# Patient Record
Sex: MALE | Race: WHITE | NOT HISPANIC OR LATINO
[De-identification: names, ages, dates, MRNs, and addresses within clinical notes are randomized per-mention and may not be internally consistent; named-entity substitution may affect disease eponyms.]

---

## 2022-10-12 ENCOUNTER — APPOINTMENT (OUTPATIENT)
Dept: ORTHOPEDIC SURGERY | Facility: CLINIC | Age: 47
End: 2022-10-12

## 2022-10-12 ENCOUNTER — RESULT CHARGE (OUTPATIENT)
Age: 47
End: 2022-10-12

## 2022-10-12 VITALS — BODY MASS INDEX: 31.15 KG/M2 | HEIGHT: 72 IN | WEIGHT: 230 LBS

## 2022-10-12 DIAGNOSIS — M72.2 PLANTAR FASCIAL FIBROMATOSIS: ICD-10-CM

## 2022-10-12 PROCEDURE — 99203 OFFICE O/P NEW LOW 30 MIN: CPT

## 2022-10-12 PROCEDURE — 72050 X-RAY EXAM NECK SPINE 4/5VWS: CPT

## 2022-10-12 PROCEDURE — 73030 X-RAY EXAM OF SHOULDER: CPT | Mod: LT

## 2022-10-12 NOTE — ASSESSMENT
[FreeTextEntry1] :  patient currently is symptomatic in his cervical spine I recommended an updated MRI to see  how much cord compression may be present  currently he is scheduled to see neurology next month  they may consider EMG/NCT's  of his upper extremities\par  he can call after the   cervical MRI is done to discuss findings and any direction of treatment it may be appropriate to consider  such as a cervical epidural injection  I deferred on a cortisone injection to either shoulder today we may want to consider some physical therapy and eventually an MRI to the left shoulder\par  at this time based on his  orthopedic history his current symptoms, physical findings and test results it is my opinion that he is totally disabled unable to work    return in a few months

## 2022-10-12 NOTE — HISTORY OF PRESENT ILLNESS
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 cervical issues after an work injury and was sent for an MRI of the cervical spine did have some conservative treatment was offered a cervical epidural but did not pursue it and return fundamentally to his duties he then had another line of duty injury while working and fell through a stair injuring his right shoulder and had surgery February 2018 by  and Margaret was on light duty for over a year could not get back to full duty capacity and was retired lately has been having some pain in the left shoulder feels similar to the right describe some numbness in the right arm and occasionally left difficulty lying down.  He can sit or stand now walk 2 hours\par  he has had no diagnostics to the left shoulder or recent diagnostics to the neck such as x-ray MRI or nerve tests \par  does report complains of pain in both feet worse in the morning\par  takes medicine for high blood pressure  does have GERD allergy to Cipro does not smoke   weight 230

## 2022-10-12 NOTE — IMAGING
[de-identified] :  pleasant gentleman he has to examine on the exam table normal head posture moderate to marked limits in rotation flexion extension reflexes brisk and symmetric mild dysesthesia down into the right arm\par \par Both shoulders have restrictions in motion elevation rotation right more than impingement is noted both\par \par Mild spasm lower lumbar region some tightness to dorsal lumbar fascia hamstrings negative straight leg bilaterally\par \par \par X-rays cervical spine today moderate to marked discogenic change above and below congenital fusion\par \par  left shoulder x-rays unremarkable\par \par MRI cervical spine 09/12/2011:  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

## 2022-11-02 ENCOUNTER — APPOINTMENT (OUTPATIENT)
Dept: CARDIOLOGY | Facility: CLINIC | Age: 47
End: 2022-11-02
Payer: COMMERCIAL

## 2022-11-02 VITALS — HEIGHT: 72 IN | TEMPERATURE: 97.2 F | WEIGHT: 232 LBS | BODY MASS INDEX: 31.42 KG/M2

## 2022-11-02 VITALS — DIASTOLIC BLOOD PRESSURE: 80 MMHG | HEART RATE: 59 BPM | SYSTOLIC BLOOD PRESSURE: 130 MMHG

## 2022-11-02 PROCEDURE — 99204 OFFICE O/P NEW MOD 45 MIN: CPT | Mod: 25

## 2022-11-02 PROCEDURE — 93000 ELECTROCARDIOGRAM COMPLETE: CPT | Mod: 59

## 2022-11-02 PROCEDURE — 93242 EXT ECG>48HR<7D RECORDING: CPT

## 2022-11-02 NOTE — REASON FOR VISIT
[Symptom and Test Evaluation] : symptom and test evaluation [Hypertension] : hypertension [FreeTextEntry3] : Dr. Britt

## 2022-11-02 NOTE — HISTORY OF PRESENT ILLNESS
[FreeTextEntry1] : The patient has HTN and GERD and hiatal hernia when he lies down at night he feels a pounding in chest . His HR is not in particular fast but is forceful. He takes his BP during this time and it is usually normal . He takes Pantoprazole for his GERD

## 2022-11-02 NOTE — ASSESSMENT
[FreeTextEntry1] : The patient has had chest pounding usually when lying down at night . He does not feel his HR is fast during this however . This usually wakes him up .

## 2022-11-02 NOTE — REVIEW OF SYSTEMS
[Chest Discomfort] : chest discomfort [Tingling (Paresthesia)] : tingling [Negative] : Heme/Lymph [SOB] : no shortness of breath [Dyspnea on exertion] : not dyspnea during exertion

## 2022-11-14 ENCOUNTER — APPOINTMENT (OUTPATIENT)
Dept: CARDIOLOGY | Facility: CLINIC | Age: 47
End: 2022-11-14

## 2022-11-14 PROCEDURE — 93244 EXT ECG>48HR<7D REV&INTERPJ: CPT

## 2023-01-04 ENCOUNTER — APPOINTMENT (OUTPATIENT)
Dept: CARDIOLOGY | Facility: CLINIC | Age: 48
End: 2023-01-04
Payer: COMMERCIAL

## 2023-01-04 PROCEDURE — 93320 DOPPLER ECHO COMPLETE: CPT

## 2023-01-04 PROCEDURE — 93351 STRESS TTE COMPLETE: CPT

## 2023-01-04 PROCEDURE — 93325 DOPPLER ECHO COLOR FLOW MAPG: CPT

## 2023-01-17 ENCOUNTER — APPOINTMENT (OUTPATIENT)
Dept: ORTHOPEDIC SURGERY | Facility: CLINIC | Age: 48
End: 2023-01-17
Payer: COMMERCIAL

## 2023-01-17 DIAGNOSIS — M77.11 LATERAL EPICONDYLITIS, RIGHT ELBOW: ICD-10-CM

## 2023-01-17 PROCEDURE — 99213 OFFICE O/P EST LOW 20 MIN: CPT

## 2023-01-17 NOTE — IMAGING
[de-identified] :  pleasant gentleman he has to examine on the exam table normal head posture moderate to marked limits in rotation flexion extension reflexes brisk and symmetric mild dysesthesia down into the right arm\par \par \par Both shoulders have restrictions in motion elevation rotation right more than impingement is noted both\par \par Mild spasm lower lumbar region some tightness to dorsal lumbar fascia hamstrings negative straight leg bilaterally\par  MRI lumbar spine 12/06/2022 broad-based herniated disc L4-5 and L5-S1 bulge at L3-4\par \par \par X-rays cervical spine today moderate to marked discogenic change above and below congenital fusion\par \par  left shoulder x-rays unremarkable\par \par MRI cervical spine 09/12/2011:  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 degenerative osteophytes above and below with disc bulges mild cord flattening without edema of spinal cord or myelomalacia\par \par  MRI cervical spine 10/27/2022 diffuse degenerative change disc herniation 4 5 no significant stenosis multilevel foraminal narrowing \par \par MRI right shoulder  01/10/2018: Tendinosis with focal full-thickness rotator tear of supraspinatus\par \par  both ankles have good motion tenderness on the medial side calcaneus good arch negative anterior drawer Achilles intact\par  right elbow full motion pain laterally worse with resisted supination extension at the wrist

## 2023-01-17 NOTE — HISTORY OF PRESENT ILLNESS
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 cervical issues after an work injury and was sent for an MRI of the cervical spine did have some conservative treatment was offered a cervical epidural but did not pursue it and return fundamentally to his duties he then had another line of duty injury while working and fell through a stair injuring his right shoulder and had surgery February 2018 by  and Margaret was on light duty for over a year could not get back to full duty capacity and was retired lately has been having some pain in the left shoulder feels similar to the right describe some numbness in the right arm and occasionally left difficulty lying down.  He can sit or stand now walk 2 hours\par  he has had no diagnostics to the left shoulder or recent diagnostics to the neck such as x-ray MRI or nerve tests \par  does report complains of pain in both feet worse in the morning\par  takes medicine for high blood pressure  does have GERD allergy to Cipro does not smoke   weight 230

## 2023-01-17 NOTE — ASSESSMENT
[FreeTextEntry1] :  patient currently is symptomatic in his cervical spine  with pain beyond the left scapula\par   I deferred on a cortisone injection to either shoulder today we  will continue physical therapy and eventually an MRI to the left shoulder\par  at this time based on his  orthopedic history his current symptoms, physical findings and test results it is my opinion that he is totally disabled unable to work    return in a few months\par  I asked that he get me the nerve test report deferred on a cortisone injection in the right elbow

## 2023-01-17 NOTE — REASON FOR VISIT
[FreeTextEntry2] : cervical spine and bilateral shoulder pain\par Been having some right dominant elbow pain a couple  of months pain in the neck behind the left shoulder blade was sent for therapy 10 visits did get the MRI cervical spine and was also sent for lumbar spine  by his pain management doctor in addition to getting nerve tests done

## 2023-01-20 ENCOUNTER — APPOINTMENT (OUTPATIENT)
Dept: CARDIOLOGY | Facility: CLINIC | Age: 48
End: 2023-01-20
Payer: COMMERCIAL

## 2023-01-20 VITALS
HEIGHT: 72 IN | WEIGHT: 230 LBS | BODY MASS INDEX: 31.15 KG/M2 | SYSTOLIC BLOOD PRESSURE: 128 MMHG | DIASTOLIC BLOOD PRESSURE: 80 MMHG | HEART RATE: 58 BPM

## 2023-01-20 DIAGNOSIS — Z00.00 ENCOUNTER FOR GENERAL ADULT MEDICAL EXAMINATION W/OUT ABNORMAL FINDINGS: ICD-10-CM

## 2023-01-20 DIAGNOSIS — R07.89 OTHER CHEST PAIN: ICD-10-CM

## 2023-01-20 PROCEDURE — 99214 OFFICE O/P EST MOD 30 MIN: CPT | Mod: 25

## 2023-01-20 PROCEDURE — 93000 ELECTROCARDIOGRAM COMPLETE: CPT

## 2023-01-20 NOTE — ASSESSMENT
[FreeTextEntry1] : The patient has periods of chest pounding . EPATCH showed no arrhythmias during these symptoms . He has SVT vs sinus tachycardia while fishing but no symptoms . There is suggestion of intermittent Brugada pattern on ECG .Diffiscult to say if arrhythjmias are causing his symptms . He does have a bradycardia on ECG but does not have chronotropic incompetence

## 2023-01-20 NOTE — HISTORY OF PRESENT ILLNESS
[FreeTextEntry1] : 47 y.o. male with PMH of HTN, hyperlipidemia, complains of atypical chest pain and "chest pounding", GERD presents for F/U cardiology visit. Terrie is in [place] today. He had Holter monitor in November which showed no events or arrhythmia, HR  with average of 73 bpm. Normal LV function on TTE. Cholesterol is near goal. Terrie had endoscopy recently: hiatal hernia, Mayo esophagus.\par Terrie complains of continuing pounding in his chest, which wakes him up, usually happens after big meals. Going through stressful period right now.  ECG today shows repolarization c/w Brugada ( intermiitent )  . The patient did have a rapid heartr rhythm on EPATCH during fishing but he was not symptomatic with that . \par \par

## 2023-01-20 NOTE — CARDIOLOGY SUMMARY
[de-identified] : 1/20/2023 Sinus bradycardia 48bpm 1degree AVB\par 11-2-2022 Sinus Bradycardia IVCD \par 1-  Sinus bradycardia RSR' with ST changes c/w Brugada (intermittent)  [de-identified] : Holter\par 11/2-11/7/2023: HR  with average 73bpm, small burden of PACs and PVCs.  [de-identified] : Stress ECHO\par 01/04/2023- negative for ischemia, EF 61%, ascending aorta 3.3 cm. Completed Stage III

## 2023-02-07 ENCOUNTER — APPOINTMENT (OUTPATIENT)
Dept: CARDIOLOGY | Facility: CLINIC | Age: 48
End: 2023-02-07
Payer: COMMERCIAL

## 2023-02-07 ENCOUNTER — APPOINTMENT (OUTPATIENT)
Dept: ELECTROPHYSIOLOGY | Facility: CLINIC | Age: 48
End: 2023-02-07
Payer: COMMERCIAL

## 2023-02-07 VITALS
HEIGHT: 72 IN | OXYGEN SATURATION: 98 % | WEIGHT: 230 LBS | HEART RATE: 59 BPM | DIASTOLIC BLOOD PRESSURE: 84 MMHG | SYSTOLIC BLOOD PRESSURE: 130 MMHG | BODY MASS INDEX: 31.15 KG/M2

## 2023-02-07 DIAGNOSIS — I49.8 OTHER SPECIFIED CARDIAC ARRHYTHMIAS: ICD-10-CM

## 2023-02-07 DIAGNOSIS — Z72.0 TOBACCO USE: ICD-10-CM

## 2023-02-07 DIAGNOSIS — Z86.79 PERSONAL HISTORY OF OTHER DISEASES OF THE CIRCULATORY SYSTEM: ICD-10-CM

## 2023-02-07 DIAGNOSIS — Z80.1 FAMILY HISTORY OF MALIGNANT NEOPLASM OF TRACHEA, BRONCHUS AND LUNG: ICD-10-CM

## 2023-02-07 PROCEDURE — 93000 ELECTROCARDIOGRAM COMPLETE: CPT

## 2023-02-07 PROCEDURE — ZZZZZ: CPT

## 2023-02-07 PROCEDURE — 99215 OFFICE O/P EST HI 40 MIN: CPT | Mod: 25

## 2023-02-14 ENCOUNTER — APPOINTMENT (OUTPATIENT)
Dept: ELECTROPHYSIOLOGY | Facility: CLINIC | Age: 48
End: 2023-02-14

## 2023-02-16 ENCOUNTER — APPOINTMENT (OUTPATIENT)
Dept: PAIN MANAGEMENT | Facility: CLINIC | Age: 48
End: 2023-02-16
Payer: COMMERCIAL

## 2023-02-16 VITALS — WEIGHT: 230 LBS | BODY MASS INDEX: 31.15 KG/M2 | HEIGHT: 72 IN

## 2023-02-16 PROCEDURE — 96136 PSYCL/NRPSYC TST PHY/QHP 1ST: CPT | Mod: 59

## 2023-02-16 PROCEDURE — 99204 OFFICE O/P NEW MOD 45 MIN: CPT | Mod: 25

## 2023-02-16 NOTE — HISTORY OF PRESENT ILLNESS
[FreeTextEntry1] : HISTORY OF PRESENT ILLNESS: Mr. Steve is a 47 year old male complaining of shoulder, neck and lower back pain.  He is a retired [place] .  He is also undergoing cardiac workup for possible electrophysiologic abnormality.  He is also in conjunction seeing orthopedics for his extremity complaints.\par \par As for his neck pain, he states the pain is constant in nature.  He states the pain is severe and currently rated as a 9/10 on the pain scale.  He states the pain starts in the cervical spine radiates into the right upper extremity with associated numbness, tingling and spasms. \par \par As for his lower back pain, he states the pain is constant in nature.  He states the pain is severe and currently rated as a 9/10 on the pain scale.  He states the pain starts in the cervical spine radiates the bilateral lower extremities with associated numbness, tingling and spasms.  He states there is also pain with sitting, standing or walking secondary to the pain.\par \par The pain started after a work related injury.  The patient has had this pain for 10 on the half years, with pain worsening over the last 2 years.  Patient describes the pain as severe.  During the last month the pain has been nearly constant with symptoms worsening no typical pattern. Pain described as pressure-like, dull/aching.  Pain is associated with numbness/pins and needles into the upper and lower extremities.  Patient has weakness in the upper and lower extremities.  Pain is increased with lying down, sitting, relaxing.  Pain is decreased with standing, exercising.  Pain is not changed with walking, coughing/sneezing and bowel movements.  Bowel or bladder habits have not changed.\par  \par ACTIVITIES: Patient could walk 3-4 blocks before the pain starts.  Patient can sit 10 minutes before pain starts.  Patient can stand 1 hour before pain starts.  The patient sometimes lies down because of pain.  Patient uses no assistive walking device at this time.  Patient has difficulty going to work, participating in recreational activities at this time.\par \par PRIOR PAIN TREATMENTS:  Moderate relief with surgery, physical therapy, exercise, 10s unit, heat treatment, acupuncture and chiropractic manipulation.  No relief with cold treatment.\par \par Prior Pain Medications:  Naproxen.\par

## 2023-02-16 NOTE — PHYSICAL EXAM
[de-identified] : NECK - tenderness into the cervical paraspinals. ROM Restricted. Pain with flexion. Positive Spurling bilaterally.\par \par BACK - tenderness into the lumbar paraspinals. ROM Restricted. Pain with flexion. Positive SLR bilaterally.

## 2023-02-16 NOTE — DATA REVIEWED
[FreeTextEntry1] : MRI of the cervical spine taken on 10/27/2022 showed diffuse degenerative cervical disc change.  Central C4-5 disc herniation with cephalad extension to the mid C4 level.  No significant spinal canal stenosis.  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  \par  \par UDS: No data obtained today. \par   \par Medications that trigger a UDS: Benzodiazepines (Ativan, Xanax, Valium) etc, Barbiturates, Narcotics (Avinza, Butrans, hydrocodone, Codeine, Jayde, Methadone, Morphine, MS Contin, Opana, oxycodone, Oxycontin, Suboxone etc), Pregabalin (Lyrica), Tramadol (Ultacet, Utram etc), Tapentadol, (Nucynta) and Elist Drugs (cocaine, THC, Etc.)\par  \par Risk factors: Bipolar Illness, positive for any an illicit drugs, history of any ETOH and drug abuse, any signs of diversion, Sharing Meds, selling meds. Non consistent [place] drug reporting and above 120meq of morphine\par  \par Low risk: Patient has combination of a low risk SOAP and no risk factors. UDS would be repeated randomly every quarter

## 2023-02-16 NOTE — ASSESSMENT
[FreeTextEntry1] : 47-year-old male presenting with chief complaints of neck and lower back pain.  I will hold off on any interventions at this time.  I would like his cardiac status to be stable before proceeding with any interventions.  He will begin aggressive physical therapy targeted to the cervical and lumbar spine.  He will follow-up in 3 months for reassessment. I have explained the findings to the patient and all questions have been answered. \par \par Physical therapy of the cervical spine 2-3 times a week for 4-8 weeks stressing a home exercise program of walking, shoulder griddle strengthening,  swimming, elliptical , recumbent bike, Jules chi and Yoga. Use things that heat like hot shower or icy heat before rehab, exercising and at the beginning of the day, and ice (ice in a bag never directly on the skin) after activity and at the end of the day.\par \par Physical therapy of the lumbar spine 2-3 times a week for 4-8 weeks stressing a home exercise program of walking, shoulder griddle strengthening,  swimming, elliptical , recumbent bike, Jules chi and Yoga. Use things that heat like hot shower or icy heat before rehab, exercising and at the beginning of the day, and ice (ice in a bag never directly on the skin) after activity and at the end of the day.\par  \par \par Neuropsychological SOAPP and PCS testing was performed as an evaluation of cognition, mood, personality, behavior to assess likelihood of addiction, misuse, other aberrant medication-related behaviors, and different thoughts and feelings that may be associated with pain. The total time spent rendering and interpreting the service was approximately 20 minutes. Results will be implemented in the appropriate care of the patient \par \par Based on the patient's history with the current symptomology, physical findings on today's examination and diagnostic test results reviewed, it is my opinion that patient is totally disabled and unable to work. \par \par Entered by Maribell Tee, acting as scribe for Dr. Thomas.\par  \par The documentation recorded by the scribe, in my presence, accurately reflects the service I personally performed, and the decisions made by me with my edits as appropriate.\par  \par Best Regards, \par Caio Thomas MD \par Board Certified, Anesthesiology \par Board Certified, Pain Medicine\par

## 2023-04-03 ENCOUNTER — APPOINTMENT (OUTPATIENT)
Dept: ELECTROPHYSIOLOGY | Facility: CLINIC | Age: 48
End: 2023-04-03
Payer: COMMERCIAL

## 2023-04-03 VITALS
HEART RATE: 74 BPM | WEIGHT: 230 LBS | SYSTOLIC BLOOD PRESSURE: 140 MMHG | BODY MASS INDEX: 31.19 KG/M2 | DIASTOLIC BLOOD PRESSURE: 90 MMHG

## 2023-04-03 DIAGNOSIS — R94.31 ABNORMAL ELECTROCARDIOGRAM [ECG] [EKG]: ICD-10-CM

## 2023-04-03 DIAGNOSIS — I10 ESSENTIAL (PRIMARY) HYPERTENSION: ICD-10-CM

## 2023-04-03 DIAGNOSIS — E78.5 HYPERLIPIDEMIA, UNSPECIFIED: ICD-10-CM

## 2023-04-03 PROCEDURE — 99215 OFFICE O/P EST HI 40 MIN: CPT | Mod: 25

## 2023-04-03 PROCEDURE — 93000 ELECTROCARDIOGRAM COMPLETE: CPT | Mod: 59

## 2023-04-03 PROCEDURE — 93228 REMOTE 30 DAY ECG REV/REPORT: CPT

## 2023-04-03 RX ORDER — DULOXETINE HYDROCHLORIDE 30 MG/1
30 CAPSULE, DELAYED RELEASE PELLETS ORAL
Qty: 30 | Refills: 0 | Status: ACTIVE | COMMUNITY
Start: 2023-02-21

## 2023-04-03 RX ORDER — TIZANIDINE 4 MG/1
4 TABLET ORAL
Qty: 30 | Refills: 0 | Status: ACTIVE | COMMUNITY
Start: 2023-02-03

## 2023-04-03 RX ORDER — CHROMIUM 200 MCG
TABLET ORAL DAILY
Refills: 0 | Status: ACTIVE | COMMUNITY

## 2023-04-03 RX ORDER — PANTOPRAZOLE 40 MG/1
40 TABLET, DELAYED RELEASE ORAL DAILY
Refills: 0 | Status: ACTIVE | COMMUNITY
Start: 2022-08-08

## 2023-04-03 RX ORDER — LOSARTAN POTASSIUM AND HYDROCHLOROTHIAZIDE 12.5; 5 MG/1; MG/1
50-12.5 TABLET ORAL DAILY
Refills: 0 | Status: ACTIVE | COMMUNITY

## 2023-04-03 RX ORDER — ASPIRIN 81 MG
81 TABLET, DELAYED RELEASE (ENTERIC COATED) ORAL DAILY
Refills: 0 | Status: ACTIVE | COMMUNITY

## 2023-04-03 RX ORDER — MELOXICAM 15 MG/1
15 TABLET ORAL
Qty: 90 | Refills: 0 | Status: ACTIVE | COMMUNITY
Start: 2023-02-03

## 2023-04-03 NOTE — DISCUSSION/SUMMARY
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 \par \par Patient has normal echo and normal stress echo. He has no prior syncope. \par \par Patient has no family history of sudden cardiac death except for his brother, who  from drug overdose who  at age of 30. \par \par Patient never had any syncope of any severe palpitations. \par \par I discussed with patient the findings on his EKG and explained at length that he has spontaneous Brugada EKG on 2023 which is not present of today's EKG. I explained the implication of the possibility of Brugada syndrome, the risks of arrhythmias in patients with Brugada, and strategies to reduce that risk. \par \par Patient has no indication for defibrillator implant at this time as he did not have any prior sudden cardiac deaths, prior ventricular arrhythmias, or syncopes.\par \par I recommend a cardiac MRI to evaluate for causes of early repolarization and rule out structural heart disease, including arrhythmogenic right ventricular dysplasia that could mimic these EKG patterns.\par \par I recommend genetic testing for Brugada. If genetic testing is negative, I would recommend repeating genetic testing in 5 years.\par \par I recommend EKG evaluation for both sons, age 12 and 14, with a pediatric cardiologist.\par \par I recommend four week MCOT to assess for arrhythmias and to correlate any arrhythmias with patient's palpitations. \par \par I discussed with patient at length strategies to reduce the risk of ventricular arrhythmias in patients with Brugada. I explained at length the importance of avoiding fever and preventing fever prophylactically in case of viral illness or flu. I also explained to patient the importance of not drinking alcohol, as alcohol may worsen Brugada pattern. I also explained to patient to avoid marijuana and cocaine. I provided patient with list of medication from [email or web address] and explained to patient that at any time a medication is introduced, he needs to check it against that list and notify his prescribing physician to double-check it against that list. I also explained to patient that he should identify his dentist in case of dental procedures that require local anesthetic to avoid venous injection of local anesthetics.\par \par I explained to the patient that these precautions apple to him and his two children until a further diagnosis is made. \par \par I discussed with patient plan of care in great details. I answered all his questions to his satisfaction. Patient was pleased with the visit.\par \par Patient will follow with me in 2 months’ time. Please do not hesitate to contact me at [phone] if you have any further questions regarding this patient care.\par \par

## 2023-04-03 NOTE — CARDIOLOGY SUMMARY
Abdomen , soft, nontender, nondistended , no guarding or rigidity , no masses palpable , normal bowel sounds , Liver and Spleen , no hepatomegaly present , no hepatosplenomegaly , liver nontender , spleen not palpable  mild lipid dystrophy [de-identified] : (02/07/2023) Sinus rhythm at 58 bpm, no significant ST-T wave abnormalities. \par (01/20/2023) Sinus rhythm at 48 bpm, early repolarization in V1, V2,  with ST elevation consistent with spontaneous type 1 Brugada. [de-identified] : (11/14/2022) 5 days event monitor, no significant arrhythmias, no pauses, no VT. PVC burden less than 0.01%. APC burden 0.01%. Maximum heart rate 169 bpm.\par  [de-identified] : (01/04/2023) Stress echo. No ischemia, exercise for 9 minutes and 2 seconds. Reached 89% of maximum predicted heart rate. No arrhythmias on stress test.\par  [de-identified] :  (01/04/2023) 2D echo. Normal LV systolic function, EF 61%, normal RV size and function, normal RA, normal aortic valve, mild TR, mild MR, no pericardial effusion.

## 2023-04-03 NOTE — END OF VISIT
[Time Spent: ___ minutes] : I have spent [unfilled] minutes of time on the encounter. [FreeTextEntry3] : I, Khris Bourne, personally performed the services described in this documentation. All medical record entries made by the scribe/nurse CTA were at my direction and in my presence. I have reviewed the chart and agree that the record reflects my personal performance and is accurate and complete.\par

## 2023-04-03 NOTE — ADDENDUM
[FreeTextEntry1] : Janny RODRIGUEZ assisted in documentation on 02/08/2023   acting as a scribe for Dr. Adama Bourne.\par

## 2023-04-03 NOTE — HISTORY OF PRESENT ILLNESS
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 \par \par Hypertension, hyperlipidemia, mild intermittent palpitations, abnormal EKG, Brugada pattern of EKG from 2023.\par \par Patient reports mild intermittent palpitations, brief, occurring a few times per week and lasting a few seconds. He had a Holter monitor with Dr. Brito which did not show any significant arrhythmias. \par \par EKG from 2023 shows spontaneous Brugada pattern with ST elevation in lead V1 and V2 consistent with Brugada pattern. Prior EKGs did not show any Brugada. \par \par Patient has no prior syncope. He has a family history of sudden death. Brother  at age of 30 from drug overdose. Mother is alive. Father   from lung cancer. Patient has two boys, age 12 and 14, and are healthy and do not have any prior syncopes.\par \par Patient is of [place] descent. \par \par Patient has no chest pain, no shortness of breath at rest, no dyspnea on exertion, no dizziness, no lightheadedness, and no syncope.\par \par Patient presents for evaluation. \par

## 2023-04-04 NOTE — DISCUSSION/SUMMARY
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 \par \par Patient has normal echo and normal stress echo. He has no prior syncope. \par \par Patient has no family history of sudden cardiac death except for his brother, who  from drug overdose who  at age of 30. \par \par Patient never had any syncope of any severe palpitations. \par \par I discussed with patient the findings on his EKG and explained at length that he has spontaneous Brugada EKG on 2023 which is not present of today's EKG. I explained the implication of the possibility of Brugada syndrome, the risks of arrhythmias in patients with Brugada, and strategies to reduce that risk. \par \par Patient has no indication for defibrillator implant at this time as he did not have any prior sudden cardiac deaths, prior ventricular arrhythmias, or syncopes.\par \par I reviewed result of CMR: RV normal; findings show possible HCM with no LGE. I recommend to obtain CD from [facility] and will review echo and CMR with cardiac radiologist.\par \par I reviewed genetic testing: negative for Brugada. I recommend repeating genetic testing in 5 years.\par \par Both sons age 12 and 14, had cardiology evaluation with a pediatric cardiologist, and had normal EKGs. \par \par I reviewed result of MCOT in details: no arrhythmias. \par \par I recommend Procainamide challenge test. Patient is not interested at this time. He will consider it after CMR is reviewed.\par \par I discussed with patient at length strategies to reduce the risk of ventricular arrhythmias in patients with Brugada. I explained at length the importance of avoiding fever and preventing fever prophylactically in case of viral illness or flu. I also explained to patient the importance of not drinking alcohol, as alcohol may worsen Brugada pattern. I also explained to patient to avoid marijuana and cocaine. I provided patient with list of medication from [email or web address][email or web address] and explained to patient that at any time a medication is introduced, he needs to check it against that list and notify his prescribing physician to double-check it against that list. I also explained to patient that he should identify his dentist in case of dental procedures that require local anesthetic to avoid venous injection of local anesthetics.\par \par I discussed with patient plan of care in great details. I answered all his questions to his satisfaction. Patient was pleased with the visit.\par \par Patient will follow with me in 2 months’ time. Please do not hesitate to contact me at [phone] if you have any further questions regarding this patient care. [EKG obtained to assist in diagnosis and management of assessed problem(s)] : EKG obtained to assist in diagnosis and management of assessed problem(s)

## 2023-04-04 NOTE — HISTORY OF PRESENT ILLNESS
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 \par \par Hypertension, hyperlipidemia, mild intermittent palpitations, abnormal EKG, Brugada pattern of EKG from 2023.\par \par Patient reports mild intermittent palpitations, brief, occurring a few times per week and lasting a few seconds. He had a Holter monitor with Dr. Brito which did not show any significant arrhythmias. \par \par EKG from 2023 shows spontaneous Brugada pattern with ST elevation in lead V1 and V2 consistent with Brugada pattern. Prior EKGs did not show any Brugada. \par \par Patient has no prior syncope. He has a family history of sudden death. Brother  at age of 30 from drug overdose. Mother is alive. Father   from lung cancer. Patient has two boys, age 12 and 14, and are healthy and do not have any prior syncopes.\par \par Patient is of [place] and [place] descent. \par \par Seen in 2023 and referred for CMR, Genetic testing.\par \par Patient has no chest pain, no shortness of breath at rest, no dyspnea on exertion, no dizziness, no lightheadedness, and no syncope.\par \par Patient presents for evaluation. \par

## 2023-04-04 NOTE — CARDIOLOGY SUMMARY
[de-identified] : (04/3/2023) Sinus rhythm at 74 bpm, no significant ST-T wave abnormalities. \par (02/07/2023) Sinus rhythm at 58 bpm, no significant ST-T wave abnormalities. \par (01/20/2023) Sinus rhythm at 48 bpm, early repolarization in V1, V2,  with ST elevation consistent with spontaneous type 1 Brugada. [de-identified] : (02/07/2023) 30 days: no significant arrhythmias - average HR 71 bpm;\par (11/14/2022) 5 days event monitor, no significant arrhythmias, no pauses, no VT. PVC burden less than 0.01%. APC burden 0.01%. Maximum heart rate 169 bpm.\par  [de-identified] : (01/04/2023) Stress echo. No ischemia, exercise for 9 minutes and 2 seconds. Reached 89% of maximum predicted heart rate. No arrhythmias on stress test.\par  [de-identified] :  (01/04/2023) 2D echo. Normal LV systolic function, EF 61%, normal RV size and function, normal RA, normal aortic valve, mild TR, mild MR, no pericardial effusion. [de-identified] : (03/30/2023) CMR: LV septum anterior 1.5 cm suggestive HCM - no LGE

## 2023-04-24 ENCOUNTER — APPOINTMENT (OUTPATIENT)
Dept: ORTHOPEDIC SURGERY | Facility: CLINIC | Age: 48
End: 2023-04-24
Payer: COMMERCIAL

## 2023-04-24 PROCEDURE — 99213 OFFICE O/P EST LOW 20 MIN: CPT

## 2023-04-24 NOTE — ASSESSMENT
[FreeTextEntry1] :  patient currently is symptomatic in his cervical spine  with pain behind his left scapula\par   I deferred again on a cortisone injection to either shoulder today we  may eventually consider an MRI to the left shoulder\par  at this time based on his  orthopedic history his current symptoms, physical findings and test results it is my opinion that he is totally disabled unable to work    return in a few months\par  I asked that he get me the nerve test report                no reason for acortisone injection in the right elbow

## 2023-04-24 NOTE — HISTORY OF PRESENT ILLNESS
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 cervical issues after an work injury and was sent for an MRI of the cervical spine did have some conservative treatment was offered a cervical epidural but did not pursue it and return fundamentally to his duties he then had another line of duty injury while working and fell through a stair injuring his right shoulder and had surgery February 2018 by  and Margaret was on light duty for over a year could not get back to full duty capacity and was retired lately has been having some pain in the left shoulder feels similar to the right describe some numbness in the right arm and occasionally left difficulty lying down.  He can sit or stand now walk 2 hours\par  he has had no diagnostics to the left shoulder or recent diagnostics to the neck such as x-ray MRI or nerve tests \par  does report complains of pain in both feet worse in the morning\par  takes medicine for high blood pressure  does have GERD allergy to Cipro does not smoke   weight 230

## 2023-04-24 NOTE — IMAGING
[de-identified] :  pleasant gentleman he has to examine on the exam table normal head posture moderate to marked limits in rotation flexion extension reflexes brisk and symmetric mild dysesthesia down into the right arm\par \par \par Both shoulders have restrictions in motion elevation rotation right more than impingement is noted both\par \par Mild spasm lower lumbar region some tightness to dorsal lumbar fascia hamstrings negative straight leg bilaterally\par  MRI lumbar spine 12/06/2022 broad-based herniated disc L4-5 and L5-S1 bulge at L3-4\par \par \par X-rays cervical spine today moderate to marked discogenic change above and below congenital fusion\par \par  left shoulder x-rays unremarkable\par \par MRI cervical spine 09/12/2011:  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 degenerative osteophytes above and below with disc bulges mild cord flattening without edema of spinal cord or myelomalacia\par \par  MRI cervical spine 10/27/2022 diffuse degenerative change disc herniation 4 5 no significant stenosis multilevel foraminal narrowing \par \par MRI right shoulder  01/10/2018: Tendinosis with focal full-thickness rotator tear of supraspinatus\par \par  both ankles have good motion tenderness on the medial side calcaneus good arch negative anterior drawer Achilles intact\par  right elbow full motion pain laterally worse with resisted supination extension at the wrist

## 2023-04-24 NOTE — REASON FOR VISIT
[FreeTextEntry2] : Patient is coming in today for a follow up on cervical spine and bilateral shoulders.   Right elbows feeling better pain management gave him Cymbalta but then put on hold because he is on the cardiac evaluation still seeing pain management did get the nerve test done which were positive did go over lumbar MRI 12/06/2022

## 2023-05-08 ENCOUNTER — NON-APPOINTMENT (OUTPATIENT)
Age: 48
End: 2023-05-08

## 2023-05-16 ENCOUNTER — APPOINTMENT (OUTPATIENT)
Dept: PAIN MANAGEMENT | Facility: CLINIC | Age: 48
End: 2023-05-16

## 2023-05-30 ENCOUNTER — APPOINTMENT (OUTPATIENT)
Dept: PAIN MANAGEMENT | Facility: CLINIC | Age: 48
End: 2023-05-30

## 2023-05-30 VITALS — WEIGHT: 230 LBS | HEIGHT: 72 IN | BODY MASS INDEX: 31.15 KG/M2

## 2023-06-15 ENCOUNTER — APPOINTMENT (OUTPATIENT)
Dept: ELECTROPHYSIOLOGY | Facility: HOSPITAL | Age: 48
End: 2023-06-15
Payer: COMMERCIAL

## 2023-06-15 ENCOUNTER — OUTPATIENT (OUTPATIENT)
Dept: OUTPATIENT SERVICES | Facility: HOSPITAL | Age: 48
LOS: 1 days | Discharge: HOME | End: 2023-06-15
Payer: COMMERCIAL

## 2023-06-15 VITALS
TEMPERATURE: 98 F | SYSTOLIC BLOOD PRESSURE: 122 MMHG | DIASTOLIC BLOOD PRESSURE: 66 MMHG | HEART RATE: 59 BPM | RESPIRATION RATE: 17 BRPM | WEIGHT: 230.38 LBS | HEIGHT: 76 IN | OXYGEN SATURATION: 99 %

## 2023-06-15 DIAGNOSIS — I49.8 OTHER SPECIFIED CARDIAC ARRHYTHMIAS: ICD-10-CM

## 2023-06-15 DIAGNOSIS — M12.811 OTHER SPECIFIC ARTHROPATHIES, NOT ELSEWHERE CLASSIFIED, RIGHT SHOULDER: Chronic | ICD-10-CM

## 2023-06-15 LAB
ALBUMIN SERPL ELPH-MCNC: 4.5 G/DL — SIGNIFICANT CHANGE UP (ref 3.5–5.2)
ALP SERPL-CCNC: 70 U/L — SIGNIFICANT CHANGE UP (ref 30–115)
ALT FLD-CCNC: 33 U/L — SIGNIFICANT CHANGE UP (ref 0–41)
ANION GAP SERPL CALC-SCNC: 10 MMOL/L — SIGNIFICANT CHANGE UP (ref 7–14)
AST SERPL-CCNC: 30 U/L — SIGNIFICANT CHANGE UP (ref 0–41)
BILIRUB SERPL-MCNC: 0.7 MG/DL — SIGNIFICANT CHANGE UP (ref 0.2–1.2)
BUN SERPL-MCNC: 14 MG/DL — SIGNIFICANT CHANGE UP (ref 10–20)
CALCIUM SERPL-MCNC: 9.6 MG/DL — SIGNIFICANT CHANGE UP (ref 8.4–10.5)
CHLORIDE SERPL-SCNC: 104 MMOL/L — SIGNIFICANT CHANGE UP (ref 98–110)
CO2 SERPL-SCNC: 25 MMOL/L — SIGNIFICANT CHANGE UP (ref 17–32)
CREAT SERPL-MCNC: 0.8 MG/DL — SIGNIFICANT CHANGE UP (ref 0.7–1.5)
EGFR: 108 ML/MIN/1.73M2 — SIGNIFICANT CHANGE UP
GLUCOSE SERPL-MCNC: 105 MG/DL — HIGH (ref 70–99)
HCT VFR BLD CALC: 42.8 % — SIGNIFICANT CHANGE UP (ref 42–52)
HGB BLD-MCNC: 15.4 G/DL — SIGNIFICANT CHANGE UP (ref 14–18)
MAGNESIUM SERPL-MCNC: 2.1 MG/DL — SIGNIFICANT CHANGE UP (ref 1.8–2.4)
MCHC RBC-ENTMCNC: 32.4 PG — HIGH (ref 27–31)
MCHC RBC-ENTMCNC: 36 G/DL — SIGNIFICANT CHANGE UP (ref 32–37)
MCV RBC AUTO: 89.9 FL — SIGNIFICANT CHANGE UP (ref 80–94)
NRBC # BLD: 0 /100 WBCS — SIGNIFICANT CHANGE UP (ref 0–0)
PLATELET # BLD AUTO: 209 K/UL — SIGNIFICANT CHANGE UP (ref 130–400)
PMV BLD: 10.7 FL — HIGH (ref 7.4–10.4)
POTASSIUM SERPL-MCNC: 4.1 MMOL/L — SIGNIFICANT CHANGE UP (ref 3.5–5)
POTASSIUM SERPL-SCNC: 4.1 MMOL/L — SIGNIFICANT CHANGE UP (ref 3.5–5)
PROT SERPL-MCNC: 7 G/DL — SIGNIFICANT CHANGE UP (ref 6–8)
RBC # BLD: 4.76 M/UL — SIGNIFICANT CHANGE UP (ref 4.7–6.1)
RBC # FLD: 12 % — SIGNIFICANT CHANGE UP (ref 11.5–14.5)
SODIUM SERPL-SCNC: 139 MMOL/L — SIGNIFICANT CHANGE UP (ref 135–146)
WBC # BLD: 6.79 K/UL — SIGNIFICANT CHANGE UP (ref 4.8–10.8)
WBC # FLD AUTO: 6.79 K/UL — SIGNIFICANT CHANGE UP (ref 4.8–10.8)

## 2023-06-15 PROCEDURE — XXXXX: CPT | Mod: 1L

## 2023-06-15 PROCEDURE — 80053 COMPREHEN METABOLIC PANEL: CPT

## 2023-06-15 PROCEDURE — 83735 ASSAY OF MAGNESIUM: CPT

## 2023-06-15 PROCEDURE — 85027 COMPLETE CBC AUTOMATED: CPT

## 2023-06-15 PROCEDURE — 96365 THER/PROPH/DIAG IV INF INIT: CPT

## 2023-06-15 PROCEDURE — 36415 COLL VENOUS BLD VENIPUNCTURE: CPT

## 2023-06-15 RX ORDER — LOSARTAN/HYDROCHLOROTHIAZIDE 100MG-25MG
1 TABLET ORAL
Refills: 0 | DISCHARGE

## 2023-06-15 RX ORDER — MAGNESIUM SULFATE 500 MG/ML
2 VIAL (ML) INJECTION ONCE
Refills: 0 | Status: DISCONTINUED | OUTPATIENT
Start: 2023-06-15 | End: 2023-06-15

## 2023-06-15 RX ORDER — PROCAINAMIDE HCL 500 MG
1000 TABLET, EXTENDED RELEASE ORAL ONCE
Refills: 0 | Status: COMPLETED | OUTPATIENT
Start: 2023-06-15 | End: 2023-06-15

## 2023-06-15 RX ADMIN — Medication 520 MILLIGRAM(S): at 09:59

## 2023-06-15 NOTE — H&P ADULT - NSHPREVIEWOFSYSTEMS_GEN_ALL_CORE
CONST: No fever, chills or bodyaches  EYES: No pain, redness, drainage or visual changes.  CARD: No chest pain, palpitations  RESP: No SOB, cough, hemoptysis. No hx of asthma or COPD  MS: No joint pain, back pain or extremity pain/injury  NEURO: No headache, dizziness, paresthesias or LOC

## 2023-06-15 NOTE — ASU PATIENT PROFILE, ADULT - VISION (WITH CORRECTIVE LENSES IF THE PATIENT USUALLY WEARS THEM):
distance and reading/Normal vision: sees adequately in most situations; can see medication labels, newsprint

## 2023-06-15 NOTE — H&P ADULT - NSHPPHYSICALEXAM_GEN_ALL_CORE
CONST: Well appearing in NAD  EYES: PERRL, EOMI, Sclera and conjunctiva clear.  NECK: Non-tender  CARD: Normal S1 S2; Normal rate and rhythm  RESP: Equal BS B/L, No wheezes, rhonchi or rales. No distress  MS: Normal ROM in all extremities.  NEURO: A&Ox3, No focal deficits.

## 2023-06-15 NOTE — CHART NOTE - NSCHARTNOTEFT_GEN_A_CORE
I saw and examined patient and I reviewed his chart and blood work. I attest that there has been no clinical change in patient's condition since last assessment documented in H&P, consult, or last office visit.
Electrophysiology Study Report     Electrophysiologist:           Khris Bourne MD   Referring Physician:           Nash Brito MD     Indications   Abnormal electrocardiogram     Primary ICD-10 CM   I45.19 - Other right bundle-branch block     Diagnosis   Pre Diagnosis:   Abnormal ECG   Incomplete RBBB     Post Diagnosis:   Abnormal ECG   Incomplete RBBB     CPT Code:                  78544 - Diagnostic infusion/injection of  medication (Drug challenge)  Primary Procedures:      Drug Study (wihtout EPS)       Procedure Narrative     Details:   The patient was brought to the Procedure Room in a non-sedated and  fasting state greater than 6 hours. Informed, written  consent was obtained by physician prior to the procedure. A peripheral  intravenous access was placed by nurse. Defibrillator  pads were placed onto the chest area in an AP position. Blood  pressure, oxygenation and level of comfort were stable  throughout. Baseline vital signs and baseline ECG were obtained.  Patient was connected to a continuous ECG/telemetry  monitoring.     The procainamide dose was calculated based on 10 mg/kg. Procainamide  dose 1000 mg was diluted in 100 ml of saline. Using  a micro-drip delivery set for infusion the procainamide dose was  infused over 10 minutes.    ECG was repeated every 3 minutes during infusion, then every 10  minutes for 30 minutes, then every 30 minutes for next 4  hours.      ECG in high intercostal space (HICS) lead position was also used  during provocative testing.    There were no ECG changes during Procainamide infusion. There no  conduction abnormalities and no arrhythmias during  Procainamide infusion.     Blood pressure and heart rate remained stable.     COMPLICATIONS:   The patient tolerated the procedure well. There were no immediate  complications.    Blood Loss: none     Conclusions   -No evidence of provoked Brugada Pattern with Procainamide Challenge test  -No evidence of conduction abnormalities and arrhythmias during Procainamide infusion.      Exam Start Time:   09:45 AM   Exam End Time:     02:00 PM   Exam Duration:     255 min     I was present for the entire procedure, supervised its performance and  participated in all the key and critical portions as needed.

## 2023-06-15 NOTE — H&P ADULT - HISTORY OF PRESENT ILLNESS
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 Here today for procainamide challenge test. Denies CP, palpitations, dizziness. No syncope.

## 2023-06-16 DIAGNOSIS — I45.19 OTHER RIGHT BUNDLE-BRANCH BLOCK: ICD-10-CM

## 2023-06-19 PROBLEM — I10 ESSENTIAL (PRIMARY) HYPERTENSION: Chronic | Status: ACTIVE | Noted: 2023-06-15

## 2023-06-27 ENCOUNTER — APPOINTMENT (OUTPATIENT)
Dept: PAIN MANAGEMENT | Facility: CLINIC | Age: 48
End: 2023-06-27
Payer: COMMERCIAL

## 2023-06-27 VITALS — WEIGHT: 230 LBS | BODY MASS INDEX: 31.15 KG/M2 | HEIGHT: 72 IN

## 2023-06-27 PROCEDURE — 99214 OFFICE O/P EST MOD 30 MIN: CPT

## 2023-06-27 NOTE — PHYSICAL EXAM
[de-identified] : NECK - tenderness into the cervical paraspinals. ROM Restricted. Pain with flexion. Positive Spurling on the right. \par \par BACK - tenderness into the lumbar paraspinals. ROM Restricted. Pain with flexion. Positive SLR bilaterally.

## 2023-06-27 NOTE — ASSESSMENT
[FreeTextEntry1] : 47-year-old male presenting with chief complaints of neck and lower back pain. His neck pain is the most bothersome. He wishes to hold off on injections for now until he sees Dr. Valdovinos in August. In the interim, he will continue with conservative care for the neck and lower back. He will follow up in September. I have explained the findings to the patient and all questions have been answered. \par  \par Based on the patient's history with the current symptomology, physical findings on today's examination and diagnostic test results reviewed, it is my opinion that patient is totally disabled and unable to work. \par \par Entered by Maribell Tee, acting as scribe for Dr. Thomas.\par  \par The documentation recorded by the scribe, in my presence, accurately reflects the service I personally performed, and the decisions made by me with my edits as appropriate.\par  \par Best Regards, \par Caio Thomas MD \par Board Certified, Anesthesiology \par Board Certified, Pain Medicine\par

## 2023-06-27 NOTE — HISTORY OF PRESENT ILLNESS
[FreeTextEntry1] : ORIGINAL PRESENTATION: Mr. Steve is a 47 year old male complaining of shoulder, neck and lower back pain.  He is a retired [place] .  He is also undergoing cardiac workup for possible electrophysiologic abnormality.  He is also in conjunction seeing orthopedics for his extremity complaints.\par \par As for his neck pain, he states the pain is constant in nature.  He states the pain is severe and currently rated as a 9/10 on the pain scale.  He states the pain starts in the cervical spine radiates into the right upper extremity with associated numbness, tingling and spasms. \par \par As for his lower back pain, he states the pain is constant in nature.  He states the pain is severe and currently rated as a 9/10 on the pain scale.  He states the pain starts in the cervical spine radiates the bilateral lower extremities with associated numbness, tingling and spasms.  He states there is also pain with sitting, standing or walking secondary to the pain.\par \par The pain started after a work related injury.  The patient has had this pain for 10 on the half years, with pain worsening over the last 2 years.  Patient describes the pain as severe.  During the last month the pain has been nearly constant with symptoms worsening no typical pattern. Pain described as pressure-like, dull/aching.  Pain is associated with numbness/pins and needles into the upper and lower extremities.  Patient has weakness in the upper and lower extremities.  Pain is increased with lying down, sitting, relaxing.  Pain is decreased with standing, exercising.  Pain is not changed with walking, coughing/sneezing and bowel movements.  Bowel or bladder habits have not changed.\par \par PATIENT PRESENTS TODAY: Revisit encounter for ongoing lower back and neck pain. \par \par As for his neck pain, the pain continues to be constant in nature, rated a 9/10 on the pain scale. There is associated numbness and tingling with spasm in the right upper extremity. He states the numbness in his right hand has become unbearable. He is now having trouble with grasping objects. \par \par As for his back pain, the pain continues to be constant in nature, rated a 9/10 on the pain scale. Pain radiates into the lower extremities bilaterally with numbness and tingling. He states he cannot sit for extended periods of time. He states he constantly has to move when sitting secondary to the pain. \par \par Of note, he underwent a total cardiac workup and everything is under control. \par

## 2023-07-10 ENCOUNTER — APPOINTMENT (OUTPATIENT)
Dept: ELECTROPHYSIOLOGY | Facility: CLINIC | Age: 48
End: 2023-07-10

## 2023-08-05 LAB — Lab: NORMAL

## 2023-08-14 ENCOUNTER — APPOINTMENT (OUTPATIENT)
Dept: ORTHOPEDIC SURGERY | Facility: CLINIC | Age: 48
End: 2023-08-14
Payer: COMMERCIAL

## 2023-08-14 PROCEDURE — 99213 OFFICE O/P EST LOW 20 MIN: CPT

## 2023-08-17 NOTE — IMAGING
[de-identified] :  pleasant gentleman he has to examine on the exam table normal head posture moderate to marked limits in rotation flexion extension reflexes brisk and symmetric mild dysesthesia down into the right arm\par  \par  \par  Both shoulders have restrictions in motion elevation rotation right more than impingement is noted both\par  \par  Mild spasm lower lumbar region some tightness to dorsal lumbar fascia hamstrings negative straight leg bilaterally\par   MRI lumbar spine 12/06/2022 broad-based herniated disc L4-5 and L5-S1 bulge at L3-4\par  \par  \par  X-rays cervical spine today moderate to marked discogenic change above and below congenital fusion\par  \par   left shoulder x-rays unremarkable\par  \par  MRI cervical spine 09/12/2011:  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 degenerative osteophytes above and below with disc bulges mild cord flattening without edema of spinal cord or myelomalacia\par  \par   MRI cervical spine 10/27/2022 diffuse degenerative change disc herniation 4 5 no significant stenosis multilevel foraminal narrowing \par  \par  MRI right shoulder  01/10/2018: Tendinosis with focal full-thickness rotator tear of supraspinatus\par  \par   both ankles have good motion tenderness on the medial side calcaneus good arch negative anterior drawer Achilles intact\par   right elbow full motion pain laterally worse with resisted supination extension at the wrist

## 2023-08-17 NOTE — REASON FOR VISIT
[FreeTextEntry2] : Patient is coming in today for follow up of cervical spine, lower back and bilateral shoulders L>R still seeing pain management a lot more pain in the neck is considering having a neck injection wants to hold off on a shoulder injection still need to see copies of his EMGs

## 2023-08-17 NOTE — ASSESSMENT
[FreeTextEntry1] :  patient currently is symptomatic in his cervical spine  with pain behind his left scapula   I deferred again on a cortisone injection to either shoulder today we  may eventually consider an MRI to the left shoulder He is going to follow-up with pain management to consider initially a cervical injection  at this time based on his  orthopedic history his current symptoms, physical findings and test results it is my opinion that he is totally disabled unable to work    return in 3 months  I asked that he get me the nerve test report                no reason for a cortisone injection in the right elbow ***Addendum 8/17/2023: EMG lower extremities 12/7/2022 bilateral peripheral neuropathy evidence of right S1 radiculopathy EMG upper extremities 12/2/2022: Evidence of bilateral carpal tunnel, left ulnar demyelination and cubital tunnel bilateral ulnar sensory neuropathy with 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

## 2023-08-17 NOTE — HISTORY OF PRESENT ILLNESS
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 after an work injury and was sent for an MRI of the cervical spine did have some conservative treatment was offered a cervical epidural but did not pursue it and return fundamentally to his duties he then had another line of duty injury while working and fell through a stair injuring his right shoulder and had surgery February 2018 by  and Margaret was on light duty for over a year could not get back to full duty capacity and was retired lately has been having some pain in the left shoulder feels similar to the right describe some numbness in the right arm and occasionally left difficulty lying down.  He can sit or stand now walk 2 hours\par   he has had no diagnostics to the left shoulder or recent diagnostics to the neck such as x-ray MRI or nerve tests \par   does report complains of pain in both feet worse in the morning\par   takes medicine for high blood pressure  does have GERD allergy to Cipro does not smoke   weight 230

## 2023-09-12 ENCOUNTER — APPOINTMENT (OUTPATIENT)
Dept: PAIN MANAGEMENT | Facility: CLINIC | Age: 48
End: 2023-09-12
Payer: COMMERCIAL

## 2023-09-12 VITALS
DIASTOLIC BLOOD PRESSURE: 89 MMHG | WEIGHT: 230 LBS | HEIGHT: 72 IN | SYSTOLIC BLOOD PRESSURE: 145 MMHG | BODY MASS INDEX: 31.15 KG/M2 | HEART RATE: 75 BPM

## 2023-09-12 DIAGNOSIS — M25.512 PAIN IN LEFT SHOULDER: ICD-10-CM

## 2023-09-12 PROCEDURE — 99214 OFFICE O/P EST MOD 30 MIN: CPT

## 2023-09-19 ENCOUNTER — APPOINTMENT (OUTPATIENT)
Dept: PAIN MANAGEMENT | Facility: CLINIC | Age: 48
End: 2023-09-19
Payer: COMMERCIAL

## 2023-09-19 PROCEDURE — 73020 X-RAY EXAM OF SHOULDER: CPT

## 2023-09-19 PROCEDURE — 20610 DRAIN/INJ JOINT/BURSA W/O US: CPT | Mod: LT

## 2023-09-19 PROCEDURE — 77002 NEEDLE LOCALIZATION BY XRAY: CPT

## 2023-12-06 ENCOUNTER — APPOINTMENT (OUTPATIENT)
Dept: PAIN MANAGEMENT | Facility: CLINIC | Age: 48
End: 2023-12-06

## 2023-12-11 ENCOUNTER — APPOINTMENT (OUTPATIENT)
Dept: ORTHOPEDIC SURGERY | Facility: CLINIC | Age: 48
End: 2023-12-11
Payer: COMMERCIAL

## 2023-12-11 PROCEDURE — 99213 OFFICE O/P EST LOW 20 MIN: CPT

## 2024-03-13 ENCOUNTER — APPOINTMENT (OUTPATIENT)
Dept: ORTHOPEDIC SURGERY | Facility: CLINIC | Age: 49
End: 2024-03-13
Payer: COMMERCIAL

## 2024-03-13 PROCEDURE — 99213 OFFICE O/P EST LOW 20 MIN: CPT

## 2024-03-13 NOTE — IMAGING
[de-identified] :  pleasant gentleman he has to examine on the exam table normal head posture moderate to marked limits in rotation flexion extension reflexes brisk and symmetric mild dysesthesia down into the right arm   Both shoulders have restrictions in motion elevation rotation right more than left  impingement is noted both  Mild spasm lower lumbar region some tightness to dorsal lumbar fascia hamstrings negative straight leg bilaterally  MRI lumbar spine 12/06/2022 broad-based herniated disc L4-5 and L5-S1 bulge at L3-4   X-rays cervical spine today moderate to marked discogenic change above and below congenital fusion   left shoulder x-rays unremarkable  MRI cervical spine 09/12/2011:  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 degenerative osteophytes above and below with disc bulges mild cord flattening without edema of spinal cord or myelomalacia   MRI cervical spine 10/27/2022 diffuse degenerative change disc herniation 4 5 no significant stenosis multilevel foraminal narrowing   MRI right shoulder  01/10/2018: Tendinosis with focal full-thickness rotator tear of supraspinatus ***MRI left shoulder 1/10/24:  focal full thickness rtc tear   both ankles have good motion tenderness on the medial side calcaneus good arch negative anterior drawer Achilles intact  right elbow full motion pain laterally worse with resisted supination extension at the wrist

## 2024-03-13 NOTE — REASON FOR VISIT
[FreeTextEntry2] : Patient is coming in today for follow up of cervical spine, lower back and bilateral shoulders.  had left shoulder MRI 1/10/24 no more injections   had only lasted a few weeks

## 2024-03-13 NOTE — HISTORY OF PRESENT ILLNESS
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 after an work injury and was sent for an MRI of the cervical spine did have some conservative treatment was offered a cervical epidural but did not pursue it and return fundamentally to his duties he then had another line of duty injury while working and fell through a stair injuring his right shoulder and had surgery February 2018 by  and Margaret was on light duty for over a year could not get back to full duty capacity and was retired lately has been having some pain in the left shoulder feels similar to the right describe some numbness in the right arm and occasionally left difficulty lying down.  He can sit or stand now walk 2 hours\par   he has had no diagnostics to the left shoulder or recent diagnostics to the neck such as x-ray MRI or nerve tests \par   does report complains of pain in both feet worse in the morning\par   takes medicine for high blood pressure  does have GERD allergy to Cipro does not smoke   weight 230

## 2024-03-13 NOTE — ASSESSMENT
[FreeTextEntry1] : continue follow-up with pain management he remains totally disabled unable to work I will see him in 3 months  at this time based on his orthopedic history his current symptoms, physical findings and test results it is my opinion that he is totally disabled unable to work

## 2024-06-12 ENCOUNTER — APPOINTMENT (OUTPATIENT)
Dept: ORTHOPEDIC SURGERY | Facility: CLINIC | Age: 49
End: 2024-06-12
Payer: COMMERCIAL

## 2024-06-12 DIAGNOSIS — M54.12 RADICULOPATHY, CERVICAL REGION: ICD-10-CM

## 2024-06-12 DIAGNOSIS — M51.9 UNSPECIFIED THORACIC, THORACOLUMBAR AND LUMBOSACRAL INTERVERTEBRAL DISC DISORDER: ICD-10-CM

## 2024-06-12 DIAGNOSIS — M54.16 RADICULOPATHY, LUMBAR REGION: ICD-10-CM

## 2024-06-12 DIAGNOSIS — M77.8 OTHER ENTHESOPATHIES, NOT ELSEWHERE CLASSIFIED: ICD-10-CM

## 2024-06-12 DIAGNOSIS — M50.90 CERVICAL DISC DISORDER, UNSPECIFIED, UNSPECIFIED CERVICAL REGION: ICD-10-CM

## 2024-06-12 PROCEDURE — 99213 OFFICE O/P EST LOW 20 MIN: CPT

## 2024-06-12 PROCEDURE — G2211 COMPLEX E/M VISIT ADD ON: CPT | Mod: NC
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
## 2024-06-12 NOTE — ASSESSMENT
[FreeTextEntry1] : continue follow-up with pain management/PT he remains totally disabled unable to work I will see him in 4 months  at this time based on his orthopedic history his current symptoms, physical findings and test results it is my opinion that he is totally disabled unable to work

## 2024-06-12 NOTE — IMAGING
[de-identified] :  pleasant gentleman he has to examine on the exam table normal head posture moderate to marked limits in rotation flexion extension reflexes brisk and symmetric mild dysesthesia down into the right arm   Both shoulders have restrictions in motion elevation rotation right more than left  impingement is noted both  Mild spasm lower lumbar region some tightness to dorsal lumbar fascia hamstrings negative straight leg bilaterally  MRI lumbar spine 12/06/2022 broad-based herniated disc L4-5 and L5-S1 bulge at L3-4   X-rays cervical spine today moderate to marked discogenic change above and below congenital fusion   left shoulder x-rays unremarkable  MRI cervical spine 09/12/2011:  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 degenerative osteophytes above and below with disc bulges mild cord flattening without edema of spinal cord or myelomalacia   MRI cervical spine 10/27/2022 diffuse degenerative change disc herniation 4 5 no significant stenosis multilevel foraminal narrowing   MRI right shoulder  01/10/2018: Tendinosis with focal full-thickness rotator tear of supraspinatus ***MRI left shoulder 1/10/24:  focal full thickness rtc tear   both ankles have good motion tenderness on the medial side calcaneus good arch negative anterior drawer Achilles intact  right elbow full motion pain laterally worse with resisted supination extension at the wrist

## 2024-06-12 NOTE — HISTORY OF PRESENT ILLNESS
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 after an work injury and was sent for an MRI of the cervical spine did have some conservative treatment was offered a cervical epidural but did not pursue it and return fundamentally to his duties he then had another line of duty injury while working and fell through a stair injuring his right shoulder and had surgery February 2018 by  and Margaret was on light duty for over a year could not get back to full duty capacity and was retired lately has been having some pain in the left shoulder feels similar to the right describe some numbness in the right arm and occasionally left difficulty lying down.  He can sit or stand now walk 2 hours\par   he has had no diagnostics to the left shoulder or recent diagnostics to the neck such as x-ray MRI or nerve tests \par   does report complains of pain in both feet worse in the morning\par   takes medicine for high blood pressure  does have GERD allergy to Cipro does not smoke   weight 230

## 2024-10-08 ENCOUNTER — APPOINTMENT (OUTPATIENT)
Dept: ORTHOPEDIC SURGERY | Facility: CLINIC | Age: 49
End: 2024-10-08

## 2025-04-21 ENCOUNTER — APPOINTMENT (OUTPATIENT)
Dept: ORTHOPEDIC SURGERY | Facility: CLINIC | Age: 50
End: 2025-04-21
Payer: MEDICARE

## 2025-04-21 DIAGNOSIS — M77.8 OTHER ENTHESOPATHIES, NOT ELSEWHERE CLASSIFIED: ICD-10-CM

## 2025-04-21 DIAGNOSIS — M50.90 CERVICAL DISC DISORDER, UNSPECIFIED, UNSPECIFIED CERVICAL REGION: ICD-10-CM

## 2025-04-21 DIAGNOSIS — M54.16 RADICULOPATHY, LUMBAR REGION: ICD-10-CM

## 2025-04-21 DIAGNOSIS — M51.9 UNSPECIFIED THORACIC, THORACOLUMBAR AND LUMBOSACRAL INTERVERTEBRAL DISC DISORDER: ICD-10-CM

## 2025-04-21 PROCEDURE — G2211 COMPLEX E/M VISIT ADD ON: CPT

## 2025-04-21 PROCEDURE — 99203 OFFICE O/P NEW LOW 30 MIN: CPT
